# Patient Record
Sex: FEMALE | Race: ASIAN | NOT HISPANIC OR LATINO
[De-identification: names, ages, dates, MRNs, and addresses within clinical notes are randomized per-mention and may not be internally consistent; named-entity substitution may affect disease eponyms.]

---

## 2017-04-10 ENCOUNTER — APPOINTMENT (OUTPATIENT)
Dept: ORTHOPEDIC SURGERY | Facility: CLINIC | Age: 57
End: 2017-04-10

## 2017-04-10 DIAGNOSIS — M51.36 OTHER INTERVERTEBRAL DISC DEGENERATION, LUMBAR REGION: ICD-10-CM

## 2018-07-06 ENCOUNTER — EMERGENCY (EMERGENCY)
Facility: HOSPITAL | Age: 58
LOS: 1 days | Discharge: HOME | End: 2018-07-06
Attending: EMERGENCY MEDICINE | Admitting: EMERGENCY MEDICINE
Payer: MEDICAID

## 2018-07-06 VITALS
OXYGEN SATURATION: 100 % | DIASTOLIC BLOOD PRESSURE: 77 MMHG | SYSTOLIC BLOOD PRESSURE: 156 MMHG | RESPIRATION RATE: 14 BRPM | HEART RATE: 70 BPM | TEMPERATURE: 98 F

## 2018-07-06 DIAGNOSIS — Z98.89 OTHER SPECIFIED POSTPROCEDURAL STATES: Chronic | ICD-10-CM

## 2018-07-06 LAB
ALBUMIN SERPL ELPH-MCNC: 4.5 G/DL — SIGNIFICANT CHANGE UP (ref 3.3–5)
ALP SERPL-CCNC: 74 U/L — SIGNIFICANT CHANGE UP (ref 40–120)
ALT FLD-CCNC: 26 U/L — SIGNIFICANT CHANGE UP (ref 4–33)
APPEARANCE UR: CLEAR — SIGNIFICANT CHANGE UP
AST SERPL-CCNC: 31 U/L — SIGNIFICANT CHANGE UP (ref 4–32)
BACTERIA # UR AUTO: SIGNIFICANT CHANGE UP
BASE EXCESS BLDV CALC-SCNC: 3.7 MMOL/L — SIGNIFICANT CHANGE UP
BASOPHILS # BLD AUTO: 0.05 K/UL — SIGNIFICANT CHANGE UP (ref 0–0.2)
BASOPHILS NFR BLD AUTO: 0.9 % — SIGNIFICANT CHANGE UP (ref 0–2)
BILIRUB SERPL-MCNC: < 0.2 MG/DL — LOW (ref 0.2–1.2)
BILIRUB UR-MCNC: NEGATIVE — SIGNIFICANT CHANGE UP
BLOOD GAS VENOUS - CREATININE: 0.51 MG/DL — SIGNIFICANT CHANGE UP (ref 0.5–1.3)
BLOOD UR QL VISUAL: NEGATIVE — SIGNIFICANT CHANGE UP
BUN SERPL-MCNC: 9 MG/DL — SIGNIFICANT CHANGE UP (ref 7–23)
CALCIUM SERPL-MCNC: 9.5 MG/DL — SIGNIFICANT CHANGE UP (ref 8.4–10.5)
CHLORIDE BLDV-SCNC: 105 MMOL/L — SIGNIFICANT CHANGE UP (ref 96–108)
CHLORIDE SERPL-SCNC: 102 MMOL/L — SIGNIFICANT CHANGE UP (ref 98–107)
CO2 SERPL-SCNC: 28 MMOL/L — SIGNIFICANT CHANGE UP (ref 22–31)
COLOR SPEC: SIGNIFICANT CHANGE UP
CREAT SERPL-MCNC: 0.62 MG/DL — SIGNIFICANT CHANGE UP (ref 0.5–1.3)
EOSINOPHIL # BLD AUTO: 0.59 K/UL — HIGH (ref 0–0.5)
EOSINOPHIL NFR BLD AUTO: 10.2 % — HIGH (ref 0–6)
GAS PNL BLDV: 138 MMOL/L — SIGNIFICANT CHANGE UP (ref 136–146)
GLUCOSE BLDV-MCNC: 183 — HIGH (ref 70–99)
GLUCOSE SERPL-MCNC: 192 MG/DL — HIGH (ref 70–99)
GLUCOSE UR-MCNC: NEGATIVE — SIGNIFICANT CHANGE UP
HCO3 BLDV-SCNC: 26 MMOL/L — SIGNIFICANT CHANGE UP (ref 20–27)
HCT VFR BLD CALC: 36.7 % — SIGNIFICANT CHANGE UP (ref 34.5–45)
HCT VFR BLDV CALC: 38.8 % — SIGNIFICANT CHANGE UP (ref 34.5–45)
HGB BLD-MCNC: 11.7 G/DL — SIGNIFICANT CHANGE UP (ref 11.5–15.5)
HGB BLDV-MCNC: 12.6 G/DL — SIGNIFICANT CHANGE UP (ref 11.5–15.5)
IMM GRANULOCYTES # BLD AUTO: 0.01 # — SIGNIFICANT CHANGE UP
IMM GRANULOCYTES NFR BLD AUTO: 0.2 % — SIGNIFICANT CHANGE UP (ref 0–1.5)
KETONES UR-MCNC: NEGATIVE — SIGNIFICANT CHANGE UP
LACTATE BLDV-MCNC: 1.4 MMOL/L — SIGNIFICANT CHANGE UP (ref 0.5–2)
LEUKOCYTE ESTERASE UR-ACNC: NEGATIVE — SIGNIFICANT CHANGE UP
LYMPHOCYTES # BLD AUTO: 1.98 K/UL — SIGNIFICANT CHANGE UP (ref 1–3.3)
LYMPHOCYTES # BLD AUTO: 34.2 % — SIGNIFICANT CHANGE UP (ref 13–44)
MCHC RBC-ENTMCNC: 26.4 PG — LOW (ref 27–34)
MCHC RBC-ENTMCNC: 31.9 % — LOW (ref 32–36)
MCV RBC AUTO: 82.7 FL — SIGNIFICANT CHANGE UP (ref 80–100)
MONOCYTES # BLD AUTO: 0.37 K/UL — SIGNIFICANT CHANGE UP (ref 0–0.9)
MONOCYTES NFR BLD AUTO: 6.4 % — SIGNIFICANT CHANGE UP (ref 2–14)
MUCOUS THREADS # UR AUTO: SIGNIFICANT CHANGE UP
NEUTROPHILS # BLD AUTO: 2.79 K/UL — SIGNIFICANT CHANGE UP (ref 1.8–7.4)
NEUTROPHILS NFR BLD AUTO: 48.1 % — SIGNIFICANT CHANGE UP (ref 43–77)
NITRITE UR-MCNC: NEGATIVE — SIGNIFICANT CHANGE UP
NRBC # FLD: 0 — SIGNIFICANT CHANGE UP
PCO2 BLDV: 53 MMHG — HIGH (ref 41–51)
PH BLDV: 7.36 PH — SIGNIFICANT CHANGE UP (ref 7.32–7.43)
PH UR: 6.5 — SIGNIFICANT CHANGE UP (ref 4.6–8)
PLATELET # BLD AUTO: 327 K/UL — SIGNIFICANT CHANGE UP (ref 150–400)
PMV BLD: 9.1 FL — SIGNIFICANT CHANGE UP (ref 7–13)
PO2 BLDV: 30 MMHG — LOW (ref 35–40)
POTASSIUM BLDV-SCNC: 4 MMOL/L — SIGNIFICANT CHANGE UP (ref 3.4–4.5)
POTASSIUM SERPL-MCNC: 4.3 MMOL/L — SIGNIFICANT CHANGE UP (ref 3.5–5.3)
POTASSIUM SERPL-SCNC: 4.3 MMOL/L — SIGNIFICANT CHANGE UP (ref 3.5–5.3)
PROT SERPL-MCNC: 7.8 G/DL — SIGNIFICANT CHANGE UP (ref 6–8.3)
PROT UR-MCNC: NEGATIVE MG/DL — SIGNIFICANT CHANGE UP
RBC # BLD: 4.44 M/UL — SIGNIFICANT CHANGE UP (ref 3.8–5.2)
RBC # FLD: 13.1 % — SIGNIFICANT CHANGE UP (ref 10.3–14.5)
SAO2 % BLDV: 51.5 % — LOW (ref 60–85)
SODIUM SERPL-SCNC: 140 MMOL/L — SIGNIFICANT CHANGE UP (ref 135–145)
SP GR SPEC: 1.01 — SIGNIFICANT CHANGE UP (ref 1–1.04)
TROPONIN T, HIGH SENSITIVITY: 8 NG/L — SIGNIFICANT CHANGE UP (ref ?–14)
TROPONIN T, HIGH SENSITIVITY: 8 NG/L — SIGNIFICANT CHANGE UP (ref ?–14)
UROBILINOGEN FLD QL: NORMAL MG/DL — SIGNIFICANT CHANGE UP
WBC # BLD: 5.79 K/UL — SIGNIFICANT CHANGE UP (ref 3.8–10.5)
WBC # FLD AUTO: 5.79 K/UL — SIGNIFICANT CHANGE UP (ref 3.8–10.5)
WBC UR QL: SIGNIFICANT CHANGE UP (ref 0–?)

## 2018-07-06 PROCEDURE — 70450 CT HEAD/BRAIN W/O DYE: CPT | Mod: 26

## 2018-07-06 PROCEDURE — 73110 X-RAY EXAM OF WRIST: CPT | Mod: 26,LT

## 2018-07-06 PROCEDURE — 70486 CT MAXILLOFACIAL W/O DYE: CPT | Mod: 26

## 2018-07-06 PROCEDURE — 99218: CPT

## 2018-07-06 RX ORDER — ACETAMINOPHEN 500 MG
975 TABLET ORAL ONCE
Qty: 0 | Refills: 0 | Status: COMPLETED | OUTPATIENT
Start: 2018-07-06 | End: 2018-07-06

## 2018-07-06 RX ADMIN — Medication 975 MILLIGRAM(S): at 22:55

## 2018-07-06 RX ADMIN — Medication 975 MILLIGRAM(S): at 21:43

## 2018-07-06 NOTE — ED PROVIDER NOTE - OBJECTIVE STATEMENT
Patient is 58 y F with PMH hypothyroid, htn, hld, on ASA81 no other antivoagulants presenting with headache after fall today at home 1 hour PTA, felt in usual state of health no SOB no lightheadedness, fell forward and hit left face on wall but unsure why she fell, no LOC, family heard noise and found her immediately, has ambulated since the fall, has not been having frequent falls. Currently has headache, no vision changes, mild pain moving eyes side to side, mild pain around left face without bruising.     PMD: Konrad Davis [phone]  ROS: Denies fever, palpitations, chills, recent sickness, vision changes, cough, SOB, chest pain, abdominal pain, n/v/d/c, dysuria, hematuria, rash, new joint aches, sick contacts, and recent travel. Patient is 58 y F with PMH hypothyroid, htn, hld, not on anticoagulants presenting with headache after fall today at home 1 hour PTA, felt in usual state of health no SOB no lightheadedness, fell forward and hit left face on wall but unsure why she fell, no LOC, family heard noise and found her immediately, has ambulated since the fall, has not been having frequent falls. Currently has headache, no vision changes, mild pain moving eyes side to side, mild pain around left face without bruising.     PMD: Konrad Davis [phone]  ROS: Denies fever, palpitations, chills, recent sickness, vision changes, cough, SOB, chest pain, abdominal pain, n/v/d/c, dysuria, hematuria, rash, new joint aches, sick contacts, and recent travel.

## 2018-07-06 NOTE — ED PROVIDER NOTE - PHYSICAL EXAMINATION
Gen: NAD, AOx3  Head: NCAT  HEENT: PERRL, oral mucosa moist, normal conjunctiva  Lung: CTAB, no respiratory distress  CV: rrr, no murmurs, Normal perfusion  Abd: soft, NTND, no CVA tenderness  MSK: No edema, no visible deformities, no facial swelling, mild left forehead tenderness no hematoma no crepitus, soft compartments, nontender snuffbox bilaterally, left wrist is mildly tender to distal radial aspect but not swollen or bruised and with FROM, all other extremity joints nontender with full ROM; chest wall nontender; pelvis stable nontender; entire spine without midline tenderness and with full ROM, no stepoffs or masses   Neuro: No focal neurologic deficits, CN intact, motor and sensation intact, no dysmetria, no pronator drift, no facial droop  Skin: No rash, no abrasions   Psych: normal affect

## 2018-07-06 NOTE — ED PROVIDER NOTE - MEDICAL DECISION MAKING DETAILS
Fall of uncertain etiology without history of frequent falls, no prodrome, normal neuro exam now. Has facial tenderness and pain moving her eyes, will obtain CT head and max face. Plan: Ct head/maxface, ekg, labs, tylenol, ua with cx, reassess

## 2018-07-06 NOTE — ED ADULT TRIAGE NOTE - CHIEF COMPLAINT QUOTE
Pt arrives to ED s/p fall. Pt originally states fell d/t swollen feet, now she states does not know exactly how she fell. Pt denies chest pain/SOB prior to fall or currently, denies blood thinner use. Pt hit head on wall. No trauma to area. Hx of lower back surgery, HTN, HLD, Hypothyroidism. EKG in progress.

## 2018-07-06 NOTE — ED ADULT NURSE NOTE - OBJECTIVE STATEMENT
Received pt in room 1, pt A&Ox4, mainly Kyle speaking, refusing translation services, requests daughter to translate. Respirations even and unlabored b/l. Abdomen soft, nondistended, nontender. Daughter at bedside. MD Cooper at bedside for eval. Awaiting orders. Will continue to monitor. Received pt in room 1, pt A&Ox4, mainly Kyle speaking, refusing translation services, requests daughter to translate. Respirations even and unlabored b/l. Abdomen soft, nondistended, nontender. Daughter at bedside. MD Cooper at bedside for eval. Awaiting orders. IVL established. Labs sent. Will continue to monitor.

## 2018-07-06 NOTE — ED PROVIDER NOTE - ATTENDING CONTRIBUTION TO CARE
Dr. Cooper:  I have personally performed a face to face bedside history and physical examination of this patient. I have discussed the history, examination, review of systems, assessment and plan of management with the resident. I have reviewed the electronic medical record and amended it to reflect my history, review of systems, physical exam, assessment and plan.    58F h/o HTN, HLD, hypothyroid, presents after unwitnessed fall at home with head/facial trauma.  Pt unsure why she fell, possibly secondary to arthritis but does not remember.  Remembers hitting the wall with her hand to attempt stopping the fall, and hit her head/face.  Denies LOC.  Family heard noise and rushed to her, found her conscious.  Ambulatory after fall.    Exam:  - nad   - +TTP over left forehead/orbit  - rrr  - ctab  - abd soft ntnd  - +mild TTP left distal forearm/wrist    A/P  - fall, possibly secondary to syncope  - cbc, cmp, trop, ua  - ekg  - cxr, xray wrist  - CT head and max/face

## 2018-07-06 NOTE — ED ADULT NURSE NOTE - CHIEF COMPLAINT
The patient is a 58y Female s/p fall about an hour ago while getting up from daughter's room to go to kitchen, fell forward and hit head on wall. Denies LOC, denies blood thinner use. Pt reports does not remember how she fell. Pt was "shocked" after the fall.

## 2018-07-07 VITALS
RESPIRATION RATE: 18 BRPM | HEART RATE: 61 BPM | SYSTOLIC BLOOD PRESSURE: 117 MMHG | OXYGEN SATURATION: 100 % | TEMPERATURE: 98 F | DIASTOLIC BLOOD PRESSURE: 54 MMHG

## 2018-07-07 LAB
HBA1C BLD-MCNC: 6.9 % — HIGH (ref 4–5.6)
TROPONIN T, HIGH SENSITIVITY: 11 NG/L — SIGNIFICANT CHANGE UP (ref ?–14)

## 2018-07-07 PROCEDURE — 99217: CPT

## 2018-07-07 PROCEDURE — 93306 TTE W/DOPPLER COMPLETE: CPT | Mod: 26

## 2018-07-07 RX ORDER — MECLIZINE HCL 12.5 MG
1 TABLET ORAL
Qty: 30 | Refills: 0 | OUTPATIENT
Start: 2018-07-07 | End: 2018-07-16

## 2018-07-07 RX ORDER — LISINOPRIL 2.5 MG/1
20 TABLET ORAL DAILY
Qty: 0 | Refills: 0 | Status: DISCONTINUED | OUTPATIENT
Start: 2018-07-07 | End: 2018-07-10

## 2018-07-07 RX ORDER — MECLIZINE HCL 12.5 MG
25 TABLET ORAL ONCE
Qty: 0 | Refills: 0 | Status: COMPLETED | OUTPATIENT
Start: 2018-07-07 | End: 2018-07-07

## 2018-07-07 RX ORDER — ATENOLOL 25 MG/1
50 TABLET ORAL DAILY
Qty: 0 | Refills: 0 | Status: DISCONTINUED | OUTPATIENT
Start: 2018-07-07 | End: 2018-07-10

## 2018-07-07 RX ORDER — ACETAMINOPHEN 500 MG
650 TABLET ORAL EVERY 6 HOURS
Qty: 0 | Refills: 0 | Status: DISCONTINUED | OUTPATIENT
Start: 2018-07-07 | End: 2018-07-10

## 2018-07-07 RX ADMIN — Medication 25 MILLIGRAM(S): at 16:18

## 2018-07-07 RX ADMIN — ATENOLOL 50 MILLIGRAM(S): 25 TABLET ORAL at 06:00

## 2018-07-07 RX ADMIN — Medication 650 MILLIGRAM(S): at 16:00

## 2018-07-07 RX ADMIN — LISINOPRIL 20 MILLIGRAM(S): 2.5 TABLET ORAL at 06:00

## 2018-07-07 NOTE — ED CDU PROVIDER INITIAL DAY NOTE - ATTENDING CONTRIBUTION TO CARE
Dr. Cooper:  I performed a face to face bedside interview with patient regarding history of present illness, review of symptoms and past medical history. I completed an independent physical exam.  I have discussed patient's plan of care with PA.   I agree with note as stated above, having amended the EMR as needed to reflect my findings.   This includes HISTORY OF PRESENT ILLNESS, HIV, PAST MEDICAL/SURGICAL/FAMILY/SOCIAL HISTORY, ALLERGIES AND HOME MEDICATIONS, REVIEW OF SYSTEMS, PHYSICAL EXAM, and any PROGRESS NOTES during the time I functioned as the attending physician for this patient.    58F h/o HTN, HLD, hypothyroid, presented to ED after unwitnessed fall at home with head/facial trauma. Unclear if mechanical fall vs syncope    Exam:  - nad   - +TTP over left forehead/orbit  - rrr  - ctab  - abd soft ntnd  - +mild TTP left distal forearm/wrist    A/P  - fall, possibly secondary to syncope  - serial trop  - echo  - tele in CDU

## 2018-07-07 NOTE — ED CDU PROVIDER SUBSEQUENT DAY NOTE - ATTENDING CONTRIBUTION TO CARE
CDU MD QUINONEZ:  I performed a face to face bedside interview with patient regarding history of present illness, review of symptoms and past medical history. I completed an independent physical exam.  I have discussed patient's plan of care with PA.   I agree with note as stated above, having amended the EMR as needed to reflect my findings. I have discussed the assessment and plan of care.  This includes during the time I functioned as the attending physician for this patient.

## 2018-07-07 NOTE — ED CDU PROVIDER INITIAL DAY NOTE - DETAILS
Pt is 59 y/o female with PMHx of hypothyroid, HTN, HLD, ASA  here for eval s/p syncopal episode - tele, CE, echo am.

## 2018-07-07 NOTE — ED CDU PROVIDER INITIAL DAY NOTE - OBJECTIVE STATEMENT
Pt is 57 y/o female with PMHx of hypothyroid, HTN, HLD, ASA  here for eval s/p syncopal episode earlier today. Pt is Kyle speaking, translation provided by the daughter as pt declined translation services. The daughter states that pt was on the way to the kitchen and fell - doesn't recall feeling dizzy, denies cp, sob, palpitations, denies tripping - fell forward and hit Lt side of the head. the daughter heard a noise and came right away - pt was awake when the daughter found her, there was seizure like activity, incontinence or tongue biting involved. Pt denies visual changes, gait instability, denies recent travel/prolonged immobilization, personal/family hx of coagulopathies, non smoker, not on exogenous estrogen, never had cardiac w/u. in cdu for echo and repeat CE.

## 2018-07-07 NOTE — ED CDU PROVIDER SUBSEQUENT DAY NOTE - HISTORY
Patient is a  58 year old female who states that yesterday she had a syncopal episode. Patient was at home going to her kitchen when she fell. Patient's daughter was at home and her the noise. She came to find patient on the floor. Patient denies any dizziness or chest pain.

## 2018-07-07 NOTE — ED CDU PROVIDER INITIAL DAY NOTE - PMH
Carpal tunnel syndrome of right wrist    HTN (hypertension)    Hyperlipidemia    Hypothyroidism    Obesity (BMI 30-39.9)

## 2018-07-07 NOTE — ED CDU PROVIDER SUBSEQUENT DAY NOTE - PMH
Carpal tunnel syndrome of right wrist    HTN (hypertension)    Hyperlipidemia    Hypothyroidism    Obesity (BMI 30-39.9)

## 2018-07-07 NOTE — ED CDU PROVIDER SUBSEQUENT DAY NOTE - PROGRESS NOTE DETAILS
Patient states she is feeling better this am. No complaints of dizziness or lightheadedness. Exam: heart- RRR s1s2 nl Lungs - clear bilaterally abd - soft NT ND extrem - no edema or cyanosis. Trop T  #1 8    #2 11.  Plan for echocardiogram today . Continue to monitor on telemetry. MD QUINONEZ:  Pt reports she feels better today.  No sob, cp, palpitations, or lightheadedness.  C/o mild left frontal ha similar to ha's in past, gradual onset.  Will give tylenol, pending echo. Patient still complaining of dizziness and headache. Will offer tylenol for headache as well as Meclizine for dizziness. CT head to further evaluate as well. Continue to monitor. Patient still complaining of dizziness and headache. Will offer tylenol for headache as well as Meclizine for dizziness. Continue to monitor.

## 2018-07-07 NOTE — ED CDU PROVIDER DISPOSITION NOTE - CLINICAL COURSE
57 y/o female with syncopal episode at home.  Sent to cdu for rpt ce's and echo.  Had c/o ha and occasional dizziness while in cdu, otherwise no cp or sob or further syncopal episodes.  HA responded well to acetaminophen and meclizine.  Given recent fall, ct head ordered, neg for ich.  Echo wnl, discharged home with pcp and cards f/u.

## 2018-07-07 NOTE — ED CDU PROVIDER DISPOSITION NOTE - PLAN OF CARE
Resolution of symptoms Follow up with your PMD within 1 week. Call for appointment. Bring copies of your ER lab reports with you to MD appointment. Stay hydrated and get plenty of rest. Take meclizine 25 mg every 8 hours as needed for dizziness. Return to ER if symptoms return or worsen or if other concerns arise.

## 2018-07-08 LAB
BACTERIA UR CULT: SIGNIFICANT CHANGE UP
SPECIMEN SOURCE: SIGNIFICANT CHANGE UP

## 2019-03-18 NOTE — ED CDU PROVIDER INITIAL DAY NOTE - CROS ED NEURO POS
ASSESSMENT/PLAN     (Z00.00) Visit for preventive health examination  (primary encounter diagnosis)  Comment: Reviewed medical, surgical, family, social, an dimmunization histories.  Plan: REcommend pneumonia vaccination    (I10) Essential hypertension  Comment: Stable well controlled  Plan: CBC & AUTO DIFFERENTIAL, COMPREHENSIVE         METABOLIC PANEL        Continue verapamil, lisinopril, terazosin.    (E66.01,  Z68.41) Morbid obesity with BMI of 40.0-44.9, adult (CMS/[facility])  Comment: Weight up probable due to edema formation.   Plan: Consider using knee to ankle compression sleeves. Can get them online from [employer] or [employer].     (E55.9) Vitamin D deficiency  Comment: due for check next visit  Plan: AS above    (E78.2) Mixed hyperlipidemia  Comment: Mild elevation.   Plan: LIPID PANEL WITH REFLEX        Continue lovastatin    (R73.01) Impaired fasting glucose  Comment: Improved glucose, improved beer intake  Plan: GLYCOHEMOGLOBIN        Recommend weight loss diet, Weight Watchers works best.     (M17.11) Primary osteoarthritis of right knee  Comment: Probably heading for knee replacement  Plan: SERVICE TO ORTHOPEDICS          RTC 4 months and retest lab.   
HEADACHE

## 2022-08-29 NOTE — ED CDU PROVIDER DISPOSITION NOTE - NSCDUDISPOSITION_ED_ALL_ED_DT
Patient was called and informed.  Patient verbally understood to increase her Lantus to 34 units daily and will go to the lab to have a complete UA and Microalbumin.      Patient states she will keep track of her Blood sugars then next 2 weeks and will schedule lab. She will call in 2 weeks with blood sugars    Patient states she is averaging about 40 units of Humalog daily.   Dr. Lolis Wang was informed.      Medication list updated   07-Jul-2018 18:06

## 2022-12-19 ENCOUNTER — APPOINTMENT (OUTPATIENT)
Dept: VASCULAR SURGERY | Facility: CLINIC | Age: 62
End: 2022-12-19

## 2022-12-19 VITALS
BODY MASS INDEX: 30.12 KG/M2 | DIASTOLIC BLOOD PRESSURE: 70 MMHG | HEIGHT: 63 IN | SYSTOLIC BLOOD PRESSURE: 117 MMHG | HEART RATE: 59 BPM | WEIGHT: 170 LBS

## 2022-12-19 PROCEDURE — 93923 UPR/LXTR ART STDY 3+ LVLS: CPT

## 2022-12-19 PROCEDURE — 99204 OFFICE O/P NEW MOD 45 MIN: CPT

## 2022-12-20 NOTE — PHYSICAL EXAM
[JVD] : no jugular venous distention  [Normal Breath Sounds] : Normal breath sounds [Normal Heart Sounds] : normal heart sounds [2+] : left 2+ [Ankle Swelling (On Exam)] : not present [Abdomen Masses] : No abdominal masses

## 2022-12-20 NOTE — ASSESSMENT
[FreeTextEntry1] : Patient with bilateral lower extremity pain, left worse than right.  Based on clinical examination and arterial Dopplers, there is no evidence of significant arterial insufficiency.  Suspect neuropathic origin of pain.\par \par No vascular intervention necessary.\par \par Follow-up with neurology and spine services.\par \par This was all discussed with patient in detail.

## 2022-12-20 NOTE — CONSULT LETTER
[Dear  ___] : Dear  [unfilled], [Consult Letter:] : I had the pleasure of evaluating your patient, [unfilled]. [Please see my note below.] : Please see my note below. [Consult Closing:] : Thank you very much for allowing me to participate in the care of this patient.  If you have any questions, please do not hesitate to contact me. [Sincerely,] : Sincerely, [FreeTextEntry3] : Darian Guerra M.D., F.ELBERTS., [email or web address].\par  of Vascular Surgery\par Assistant Professor of Radiology\par Director of Endovascular Program/ V[facility] Center\par [facility]

## 2022-12-20 NOTE — HISTORY OF PRESENT ILLNESS
[FreeTextEntry1] : Patient is a 63-year-old female with past medical history significant for lumbar spine surgery in 2014.  Patient underwent surgery for severe lower back pain and bilateral lower extremity pain.  Symptoms improved to some extent but over the past few years patient has developed worsening of symptoms.  At this time patient complains of lower extremity pain, left worse than right associated with poor balance.  Patient has not had any follow-up with the orthopedic or neurology services.\par \par No complaint of rest pain.  No history of tissue loss.  No history of DVT.

## 2024-04-24 ENCOUNTER — NON-APPOINTMENT (OUTPATIENT)
Age: 64
End: 2024-04-24

## 2024-04-29 ENCOUNTER — APPOINTMENT (OUTPATIENT)
Dept: ORTHOPEDIC SURGERY | Facility: CLINIC | Age: 64
End: 2024-04-29
Payer: MEDICAID

## 2024-04-29 VITALS — WEIGHT: 164 LBS | HEIGHT: 63 IN | BODY MASS INDEX: 29.06 KG/M2

## 2024-04-29 DIAGNOSIS — M54.50 LOW BACK PAIN, UNSPECIFIED: ICD-10-CM

## 2024-04-29 DIAGNOSIS — M47.817 SPONDYLOSIS W/OUT MYELOPATHY OR RADICULOPATHY, LUMBOSACRAL REGION: ICD-10-CM

## 2024-04-29 PROCEDURE — 99203 OFFICE O/P NEW LOW 30 MIN: CPT

## 2024-04-29 NOTE — PHYSICAL EXAM
[de-identified] : Examination of the lumbar spine reveals no midline tenderness palpation, step-offs, or skin lesions.  Healed lumbar incision.  Decreased range of motion with respect to flexion, extension, lateral bending, and rotation. No tenderness to palpation of the sciatic notch. No tenderness palpation of the bilateral greater trochanters. No pain with passive internal/external rotation of the hips. No instability of bilateral lower extremities.  Negative GRECIA. Negative straight leg raise bilaterally. No bowstring. Negative femoral stretch. 5 out of 5 iliopsoas, hip abductors, hips adductors, quadriceps, hamstrings, gastrocsoleus, tibialis anterior, extensor hallucis longus, peroneals. Grossly intact sensation to light touch bilateral lower extremities. 1+ patellar and Achilles reflexes. Downgoing Babinski. No clonus. Intact proprioception. Palpable pulses. No skin lesion and no edema on the right and left lower extremities. [de-identified] : AP lateral lumbar x-rays taken recently reveal spondylosis and prior laminectomy defect

## 2024-04-29 NOTE — DISCUSSION/SUMMARY
[de-identified] : We discussed further treatment options.  She will continue with over-the-counter medications as this is helping her.  She will try course of physical therapy.  MRI if not improved or worsen.

## 2024-04-29 NOTE — HISTORY OF PRESENT ILLNESS
[de-identified] : Ms. CLAYTON BELLO  is a 64 year old female who presents with low back and bilateral leg pain since last Thursday without any specific cause or trauma.  She has used heat and taken naprosyn and a muscle relaxer which provides temporary relief.  Normal bowel and bladder control.   Denies any recent fevers, chills, sweats, weight loss, or infection.  She had lumbar surgery in 2014 with Dr. Sharma.  The patients past medical history, past surgical history, medications, allergies, and social history were reviewed by me today with the patient and documented accordingly.  In addition, the patient's family history, which is noncontributory to their visit, was also reviewed.